# Patient Record
Sex: FEMALE | Race: ASIAN | NOT HISPANIC OR LATINO | Employment: UNEMPLOYED | URBAN - METROPOLITAN AREA
[De-identification: names, ages, dates, MRNs, and addresses within clinical notes are randomized per-mention and may not be internally consistent; named-entity substitution may affect disease eponyms.]

---

## 2022-09-18 ENCOUNTER — HOSPITAL ENCOUNTER (EMERGENCY)
Facility: MEDICAL CENTER | Age: 47
End: 2022-09-18
Attending: EMERGENCY MEDICINE

## 2022-09-18 VITALS
SYSTOLIC BLOOD PRESSURE: 140 MMHG | TEMPERATURE: 97.4 F | OXYGEN SATURATION: 96 % | WEIGHT: 103.4 LBS | HEART RATE: 99 BPM | DIASTOLIC BLOOD PRESSURE: 70 MMHG | RESPIRATION RATE: 16 BRPM

## 2022-09-18 DIAGNOSIS — Z76.0 MEDICATION REFILL: ICD-10-CM

## 2022-09-18 LAB
HIV 1+2 AB+HIV1 P24 AG SERPL QL IA: ABNORMAL
T PALLIDUM AB SER QL IA: NORMAL

## 2022-09-18 PROCEDURE — 86702 HIV-2 ANTIBODY: CPT

## 2022-09-18 PROCEDURE — 87389 HIV-1 AG W/HIV-1&-2 AB AG IA: CPT

## 2022-09-18 PROCEDURE — 86701 HIV-1ANTIBODY: CPT

## 2022-09-18 PROCEDURE — 86780 TREPONEMA PALLIDUM: CPT

## 2022-09-18 PROCEDURE — 87491 CHLMYD TRACH DNA AMP PROBE: CPT

## 2022-09-18 PROCEDURE — 87591 N.GONORRHOEAE DNA AMP PROB: CPT

## 2022-09-18 PROCEDURE — 36415 COLL VENOUS BLD VENIPUNCTURE: CPT

## 2022-09-18 PROCEDURE — 99283 EMERGENCY DEPT VISIT LOW MDM: CPT

## 2022-09-18 RX ORDER — BICTEGRAVIR SODIUM, EMTRICITABINE, AND TENOFOVIR ALAFENAMIDE FUMARATE 50; 200; 25 MG/1; MG/1; MG/1
1 TABLET ORAL DAILY
Qty: 30 TABLET | Refills: 0 | Status: SHIPPED | OUTPATIENT
Start: 2022-09-18

## 2022-09-18 NOTE — ED TRIAGE NOTES
Chief Complaint   Patient presents with    Medication Refill     Lost HIV meds     BP (!) 151/89   Pulse (!) 118   Temp 36.1 °C (97 °F) (Temporal)   Resp 16   Wt 46.9 kg (103 lb 6.3 oz)   SpO2 96%   Pt informed of wait times. Educated on triage process.  Asked to return to triage RN for any new or worsening of symptoms. Thanked for patience.

## 2022-09-19 LAB
C TRACH DNA SPEC QL NAA+PROBE: NEGATIVE
N GONORRHOEA DNA SPEC QL NAA+PROBE: NEGATIVE
SPECIMEN SOURCE: NORMAL

## 2022-09-19 NOTE — ED PROVIDER NOTES
"ED Provider Note    CHIEF COMPLAINT  Chief Complaint   Patient presents with    Medication Refill     Lost HIV meds       HPI  Bryanna Tanner is a 46 y.o. female here for evaluation of HIV medication refill.  Patient states that she is in need of her Biktarvy medication that she takes.  She states she has been out of the medication for about \"2 weeks.\"  She has no abdominal pain, chest pain, or shortness of breath.  She has no vomiting, fever or chills.  Patient has no other medical concerns at this time.  She is known HIV positive, but has not had medical care in quite some time since she moved from [place].    ROS;  Please see HPI  O/W negative     PAST MEDICAL HISTORY   has a past medical history of HIV (human immunodeficiency virus infection) (HCC).    SOCIAL HISTORY  Social History     Tobacco Use    Smoking status: Every Day     Types: Cigarettes    Smokeless tobacco: Never   Substance and Sexual Activity    Alcohol use: Yes     Comment: occ    Drug use: Yes     Types: Inhaled     Comment: \"weed, Meth,\"    Sexual activity: Not on file       SURGICAL HISTORY  patient denies any surgical history    CURRENT MEDICATIONS  Home Medications       Reviewed by Lesa Blake R.N. (Registered Nurse) on 09/18/22 at 1649  Med List Status: Partial     Medication Last Dose Status        Patient Sameer Taking any Medications                           ALLERGIES  Not on File    REVIEW OF SYSTEMS  See HPI for further details. Review of systems as above, otherwise all other systems are negative.     PHYSICAL EXAM  VITAL SIGNS: BP (!) 151/89   Pulse (!) 118   Temp 36.1 °C (97 °F) (Temporal)   Resp 16   Wt 46.9 kg (103 lb 6.3 oz)   SpO2 96%     Constitutional: Disheveled, unkempt  HEENT: Normocephalic, atraumatic. MMM,   Neck: Supple, Full range of motion   Chest/Pulmonary:  No respiratory distress.  Equal expansion   Musculoskeletal: No deformity, no edema, neurovascular intact.   Neuro: Clear speech, appropriate, " cooperative, cranial nerves II-XII grossly intact. Pleasant,   Psych: Normal mood and affect      PROCEDURES     MEDICAL RECORD  I have reviewed patient's medical record and pertinent results are listed.    COURSE & MEDICAL DECISION MAKING  I have reviewed any medical record information, laboratory studies and radiographic results as noted above.    The pt is nontoxic appearing, comfortable, and afebrile. At this time, she will be able to fill her meds, and will follow up    I you have had any blood pressure issues while here in the emergency department, please see your doctor for a further evaluation or work up.    Differential diagnoses include but not limited to: medication refill.    This patient presents with medication refill .  At this time, I have counseled the patient/family regarding their medications, pain control, and follow up.  They will continue their medications, if any, as prescribed.  They will return immediately for any worsening symptoms and/or any other medical concerns.  They will see their doctor, or contact the doctor provided, in 1-2 days for follow up.       FINAL IMPRESSION  1. Medication refill  bictegravir-emtricitab-TAF (BIKTARVY) -25 mg Tab tablet              Electronically signed by: Juan Velez D.O., 9/18/2022 6:10 PM

## 2022-09-21 LAB
HIV 1 & 2 AB SER-IMP: ABNORMAL
HIV 1 & 2 AB SERPL IA.RAPID: ABNORMAL
HIV 2 AB SERPL QL IA: NEGATIVE
HIV1 AB SERPL QL IA: POSITIVE

## 2022-11-10 ENCOUNTER — APPOINTMENT (OUTPATIENT)
Dept: RADIOLOGY | Facility: MEDICAL CENTER | Age: 47
End: 2022-11-10
Attending: EMERGENCY MEDICINE

## 2022-11-10 ENCOUNTER — HOSPITAL ENCOUNTER (EMERGENCY)
Facility: MEDICAL CENTER | Age: 47
End: 2022-11-10
Attending: EMERGENCY MEDICINE

## 2022-11-10 VITALS
WEIGHT: 103.62 LBS | SYSTOLIC BLOOD PRESSURE: 119 MMHG | HEART RATE: 100 BPM | HEIGHT: 63 IN | BODY MASS INDEX: 18.36 KG/M2 | TEMPERATURE: 99 F | RESPIRATION RATE: 16 BRPM | OXYGEN SATURATION: 98 % | DIASTOLIC BLOOD PRESSURE: 70 MMHG

## 2022-11-10 DIAGNOSIS — T14.8XXA BRUISE: ICD-10-CM

## 2022-11-10 DIAGNOSIS — S09.90XA CLOSED HEAD INJURY, INITIAL ENCOUNTER: ICD-10-CM

## 2022-11-10 PROCEDURE — 700102 HCHG RX REV CODE 250 W/ 637 OVERRIDE(OP): Performed by: EMERGENCY MEDICINE

## 2022-11-10 PROCEDURE — 70450 CT HEAD/BRAIN W/O DYE: CPT

## 2022-11-10 PROCEDURE — 73501 X-RAY EXAM HIP UNI 1 VIEW: CPT | Mod: RT

## 2022-11-10 PROCEDURE — 72125 CT NECK SPINE W/O DYE: CPT

## 2022-11-10 PROCEDURE — 73610 X-RAY EXAM OF ANKLE: CPT | Mod: RT

## 2022-11-10 PROCEDURE — 73070 X-RAY EXAM OF ELBOW: CPT | Mod: RT

## 2022-11-10 PROCEDURE — 99284 EMERGENCY DEPT VISIT MOD MDM: CPT

## 2022-11-10 PROCEDURE — A9270 NON-COVERED ITEM OR SERVICE: HCPCS | Performed by: EMERGENCY MEDICINE

## 2022-11-10 RX ORDER — IBUPROFEN 800 MG/1
800 TABLET ORAL EVERY 8 HOURS PRN
Qty: 30 TABLET | Refills: 0 | Status: SHIPPED | OUTPATIENT
Start: 2022-11-10

## 2022-11-10 RX ORDER — IBUPROFEN 600 MG/1
600 TABLET ORAL ONCE
Status: COMPLETED | OUTPATIENT
Start: 2022-11-10 | End: 2022-11-10

## 2022-11-10 RX ADMIN — IBUPROFEN 600 MG: 600 TABLET, FILM COATED ORAL at 13:45
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
## 2022-11-10 NOTE — ED NOTES
All results back. ERP re-eval complete. Pt states understanding of dc instructions and f/u care. Pt able to amb out w/ steady gait.

## 2022-11-10 NOTE — ED NOTES
"Pt in xray, refusing knee xray \"because I am not having knee pain and I don't think it is necessary.\"  "
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
